# Patient Record
(demographics unavailable — no encounter records)

---

## 2025-01-23 NOTE — HISTORY OF PRESENT ILLNESS
[FreeTextEntry1] : RADHA FAM 62 YO, presents for Annual G 2 P 2002 - 2  Menopausal Not sexually active x 10 years. No Medication No family history of breast cancer. To do monthly SBE. Had mammogram with DR. Christensen.  For pelvic sonogram today.  Nonsmoker, NKDA

## 2025-02-20 NOTE — HISTORY OF PRESENT ILLNESS
[FreeTextEntry1] : RADHA FAM 64 YO, presents for Results G 2 P  - 2  Menopausal Not sexually active x 10 years. No Medication PAP- WNL with atrophic pattern. Pelvic sonogram- Reviewed.  Endometrial thickness- 2.06 mm 2 small fibroids seen- intramural/subserosal Both ovaries not seen.  No free fluid or adnexal masses.